# Patient Record
Sex: FEMALE | Race: WHITE | NOT HISPANIC OR LATINO | Employment: FULL TIME | URBAN - METROPOLITAN AREA
[De-identification: names, ages, dates, MRNs, and addresses within clinical notes are randomized per-mention and may not be internally consistent; named-entity substitution may affect disease eponyms.]

---

## 2020-07-12 NOTE — PROGRESS NOTES
Subjective   Chief Complaint   Patient presents with   • Annual Exam       History of Present Illness   60 y.o. female presents as a new patient to establish care and have a physical. No previous PCP. Feeling well overall other than fatigue. Notes she has a nervous stomach and has had elevated BP in the past but has never taken anything for it. Denies N/V/D/C. Denies CP or dyspnea. Hysterectomy 15 years with Dr. Vaughn as a result of fibroids and endometriosis. No pelvic exam since then. No MMG or DXA scan.     Never had colonoscopy. Denies hematochezia or melena.     Recently seen and treated at [facility] for acute otitis media  and HA for which she was treated with Augmentin. Resolved.      There is no problem list on file for this patient.      No Known Allergies    Current Outpatient Medications on File Prior to Visit   Medication Sig Dispense Refill   • naproxen sodium (ALEVE) 220 MG tablet Take 220 mg by mouth 2 (Two) Times a Day As Needed.       No current facility-administered medications on file prior to visit.        History reviewed. No pertinent past medical history.    Family History   Problem Relation Age of Onset   • Hypertension Mother    • Stroke Mother    • Throat cancer Father    • Diabetes Maternal Grandmother        Social History     Socioeconomic History   • Marital status:      Spouse name: Not on file   • Number of children: Not on file   • Years of education: Not on file   • Highest education level: Not on file   Tobacco Use   • Smoking status: Former Smoker   • Smokeless tobacco: Never Used   • Tobacco comment: Less than 1/2 ppd for 18 months 40 years ago.    Substance and Sexual Activity   • Alcohol use: Yes     Frequency: Monthly or less     Drinks per session: 1 or 2     Comment: social    • Drug use: Never   • Sexual activity: Not Currently     Partners: Male       Past Surgical History:   Procedure Laterality Date   • HYSTERECTOMY  2005    Dr. Vaughn   • KNEE MENISCAL REPAIR Left   "   Dr. Abel   • REPLACEMENT TOTAL KNEE Right     Dr. Patterson       The following portions of the patient's history were reviewed and updated as appropriate: problem list, allergies, current medications, past medical history, past family history, past social history and past surgical history.    Review of Systems   Constitutional: Negative for fatigue.   HENT: Negative for congestion.    Eyes: Negative for visual disturbance.   Respiratory: Negative for shortness of breath.    Cardiovascular: Negative for chest pain.   Gastrointestinal: Negative for blood in stool.   Endocrine: Negative.    Genitourinary: Negative.    Musculoskeletal: Negative for arthralgias.   Skin: Negative for rash.   Allergic/Immunologic: Negative for environmental allergies.   Neurological: Negative for headache.   Hematological: Does not bruise/bleed easily.   Psychiatric/Behavioral: The patient is not nervous/anxious.        Immunization History   Administered Date(s) Administered   • Influenza Split Preservative Free ID 12/10/2012   • Tdap 07/13/2020       Objective   Vitals:    07/13/20 1016 07/13/20 1058   BP:  168/96   Pulse:  88   Resp:  14   Temp: 97.1 °F (36.2 °C)    Weight: 135 kg (297 lb)    Height: 170.2 cm (67\")      Body mass index is 46.52 kg/m².  Physical Exam   Constitutional: She appears well-developed and well-nourished.   HENT:   Head: Normocephalic and atraumatic.   Right Ear: External ear normal.   Left Ear: External ear normal.   Nose: Nose normal.   Mouth/Throat: Oropharynx is clear and moist.   Eyes: Pupils are equal, round, and reactive to light. Conjunctivae and EOM are normal.   Neck: Neck supple. Carotid bruit is not present. No thyromegaly present.   Cardiovascular: Normal rate, regular rhythm, normal heart sounds and intact distal pulses.   No murmur heard.  Pulmonary/Chest: Effort normal and breath sounds normal.   Abdominal: Soft. Bowel sounds are normal. She exhibits no distension and no mass. There is no " hepatosplenomegaly. There is no tenderness. There is no rebound.   Genitourinary:   Genitourinary Comments: P/P and CBE with GYN.    Musculoskeletal:   Ambulates without assistance; no clubbing, cyanosis or edema.    Lymphadenopathy:     She has no cervical adenopathy.   Neurological: She is alert.   Skin: Skin is warm and dry.   Psychiatric: She has a normal mood and affect.   Vitals reviewed.      Procedures    Assessment/Plan   Oanh was seen today for annual exam.    Diagnoses and all orders for this visit:    Annual physical exam  Comments:  Weight loss via dietary changes and exercise advised. Flu shot when available. SG at local pharmacy. C-scope with  as below.   Orders:  -     Comprehensive Metabolic Panel  -     Lipid Panel With / Chol / HDL Ratio    Need for hepatitis C screening test  -     HCV Antibody Rfx To Qnt PCR    Need for Tdap vaccination  -     Tdap Vaccine Greater Than or Equal To 6yo IM    Colon cancer screening  Comments:  With Dr. Long.   Orders:  -     Ambulatory Referral For Screening Colonoscopy    Fatigue, unspecified type  -     Comprehensive Metabolic Panel  -     CBC & Differential  -     TSH    Essential hypertension  Comments:  New problem. Start Lisinopril as below. Side effects reviewed. Weight loss via dietary changes and exercise strongly advised. EKG at follow up visit.   Orders:  -     Comprehensive Metabolic Panel  -     lisinopril (PRINIVIL,ZESTRIL) 10 MG tablet; Take 1 tablet by mouth Daily.  -     Basic Metabolic Panel; Future    Records reviewed from [facility].     Return in about 1 month (around 8/13/2020).

## 2020-07-13 ENCOUNTER — OFFICE VISIT (OUTPATIENT)
Dept: INTERNAL MEDICINE | Facility: CLINIC | Age: 60
End: 2020-07-13

## 2020-07-13 VITALS
WEIGHT: 293 LBS | DIASTOLIC BLOOD PRESSURE: 96 MMHG | TEMPERATURE: 97.1 F | RESPIRATION RATE: 14 BRPM | HEIGHT: 67 IN | HEART RATE: 88 BPM | BODY MASS INDEX: 45.99 KG/M2 | SYSTOLIC BLOOD PRESSURE: 168 MMHG

## 2020-07-13 DIAGNOSIS — Z11.59 NEED FOR HEPATITIS C SCREENING TEST: ICD-10-CM

## 2020-07-13 DIAGNOSIS — R53.83 FATIGUE, UNSPECIFIED TYPE: ICD-10-CM

## 2020-07-13 DIAGNOSIS — Z12.11 COLON CANCER SCREENING: ICD-10-CM

## 2020-07-13 DIAGNOSIS — I10 ESSENTIAL HYPERTENSION: ICD-10-CM

## 2020-07-13 DIAGNOSIS — Z23 NEED FOR TDAP VACCINATION: ICD-10-CM

## 2020-07-13 DIAGNOSIS — Z00.00 ANNUAL PHYSICAL EXAM: Primary | ICD-10-CM

## 2020-07-13 PROCEDURE — 90715 TDAP VACCINE 7 YRS/> IM: CPT | Performed by: NURSE PRACTITIONER

## 2020-07-13 PROCEDURE — 90471 IMMUNIZATION ADMIN: CPT | Performed by: NURSE PRACTITIONER

## 2020-07-13 PROCEDURE — 99396 PREV VISIT EST AGE 40-64: CPT | Performed by: NURSE PRACTITIONER

## 2020-07-13 RX ORDER — NAPROXEN SODIUM 220 MG
220 TABLET ORAL 2 TIMES DAILY PRN
COMMUNITY

## 2020-07-13 RX ORDER — LISINOPRIL 10 MG/1
10 TABLET ORAL DAILY
Qty: 30 TABLET | Refills: 1 | Status: SHIPPED | OUTPATIENT
Start: 2020-07-13 | End: 2020-07-28

## 2020-07-13 NOTE — PATIENT INSTRUCTIONS
Please schedule you PAP and pelvic exam with your gynecologist Dr. Vaughn. You will have your mammogram and bone density test with them.     You are advised to get Shingrix. It is a series of 2 shots given 2-6 months apart. Get this at you local pharmacy.     Please get your flu shot when it becomes available.     Weight loss via dietary changes and 150 minutes weekly aerobic activity advised.

## 2020-07-21 DIAGNOSIS — I10 ESSENTIAL HYPERTENSION: ICD-10-CM

## 2020-07-21 DIAGNOSIS — Z11.59 NEED FOR HEPATITIS C SCREENING TEST: Primary | ICD-10-CM

## 2020-07-21 DIAGNOSIS — R53.83 FATIGUE, UNSPECIFIED TYPE: ICD-10-CM

## 2020-07-23 LAB
ALBUMIN SERPL-MCNC: 3.7 G/DL (ref 3.5–5.2)
ALBUMIN/GLOB SERPL: 1.3 G/DL
ALP SERPL-CCNC: 84 U/L (ref 39–117)
ALT SERPL-CCNC: 25 U/L (ref 1–33)
AST SERPL-CCNC: 14 U/L (ref 1–32)
BASOPHILS # BLD AUTO: 0.02 10*3/MM3 (ref 0–0.2)
BASOPHILS NFR BLD AUTO: 0.4 % (ref 0–1.5)
BILIRUB SERPL-MCNC: 1.1 MG/DL (ref 0–1.2)
BUN SERPL-MCNC: 12 MG/DL (ref 8–23)
BUN/CREAT SERPL: 13.3 (ref 7–25)
CALCIUM SERPL-MCNC: 9.6 MG/DL (ref 8.6–10.5)
CHLORIDE SERPL-SCNC: 103 MMOL/L (ref 98–107)
CHOLEST SERPL-MCNC: 192 MG/DL (ref 0–200)
CHOLEST/HDLC SERPL: 3.62 {RATIO}
CO2 SERPL-SCNC: 25.2 MMOL/L (ref 22–29)
CREAT SERPL-MCNC: 0.9 MG/DL (ref 0.57–1)
EOSINOPHIL # BLD AUTO: 0.2 10*3/MM3 (ref 0–0.4)
EOSINOPHIL NFR BLD AUTO: 4 % (ref 0.3–6.2)
ERYTHROCYTE [DISTWIDTH] IN BLOOD BY AUTOMATED COUNT: 13.3 % (ref 12.3–15.4)
GLOBULIN SER CALC-MCNC: 2.8 GM/DL
GLUCOSE SERPL-MCNC: 164 MG/DL (ref 65–99)
HCT VFR BLD AUTO: 39.9 % (ref 34–46.6)
HCV AB S/CO SERPL IA: <0.1 S/CO RATIO (ref 0–0.9)
HDLC SERPL-MCNC: 53 MG/DL (ref 40–60)
HGB BLD-MCNC: 13.8 G/DL (ref 12–15.9)
IMM GRANULOCYTES # BLD AUTO: 0.01 10*3/MM3 (ref 0–0.05)
IMM GRANULOCYTES NFR BLD AUTO: 0.2 % (ref 0–0.5)
LDLC SERPL CALC-MCNC: 116 MG/DL (ref 0–100)
LYMPHOCYTES # BLD AUTO: 2.43 10*3/MM3 (ref 0.7–3.1)
LYMPHOCYTES NFR BLD AUTO: 48.4 % (ref 19.6–45.3)
MCH RBC QN AUTO: 30.3 PG (ref 26.6–33)
MCHC RBC AUTO-ENTMCNC: 34.6 G/DL (ref 31.5–35.7)
MCV RBC AUTO: 87.5 FL (ref 79–97)
MONOCYTES # BLD AUTO: 0.35 10*3/MM3 (ref 0.1–0.9)
MONOCYTES NFR BLD AUTO: 7 % (ref 5–12)
NEUTROPHILS # BLD AUTO: 2.01 10*3/MM3 (ref 1.7–7)
NEUTROPHILS NFR BLD AUTO: 40 % (ref 42.7–76)
NRBC BLD AUTO-RTO: 0 /100 WBC (ref 0–0.2)
PLATELET # BLD AUTO: 303 10*3/MM3 (ref 140–450)
POTASSIUM SERPL-SCNC: 4.6 MMOL/L (ref 3.5–5.2)
PROT SERPL-MCNC: 6.5 G/DL (ref 6–8.5)
RBC # BLD AUTO: 4.56 10*6/MM3 (ref 3.77–5.28)
SODIUM SERPL-SCNC: 139 MMOL/L (ref 136–145)
TRIGL SERPL-MCNC: 113 MG/DL (ref 0–150)
TSH SERPL DL<=0.005 MIU/L-ACNC: 0.06 UIU/ML (ref 0.27–4.2)
VLDLC SERPL CALC-MCNC: 22.6 MG/DL
WBC # BLD AUTO: 5.02 10*3/MM3 (ref 3.4–10.8)

## 2020-07-24 LAB
HBA1C MFR BLD: 7.4 % (ref 4.8–5.6)
Lab: NORMAL
T4 FREE SERPL-MCNC: 1.32 NG/DL (ref 0.93–1.7)
WRITTEN AUTHORIZATION: NORMAL

## 2020-07-26 RX ORDER — ATORVASTATIN CALCIUM 10 MG/1
10 TABLET, FILM COATED ORAL DAILY
Qty: 90 TABLET | Refills: 1 | Status: CANCELLED | OUTPATIENT
Start: 2020-07-26

## 2020-07-28 ENCOUNTER — OFFICE VISIT (OUTPATIENT)
Dept: INTERNAL MEDICINE | Facility: CLINIC | Age: 60
End: 2020-07-28

## 2020-07-28 VITALS
SYSTOLIC BLOOD PRESSURE: 150 MMHG | HEIGHT: 67 IN | BODY MASS INDEX: 45.99 KG/M2 | WEIGHT: 293 LBS | RESPIRATION RATE: 16 BRPM | HEART RATE: 80 BPM | DIASTOLIC BLOOD PRESSURE: 100 MMHG | TEMPERATURE: 97.3 F

## 2020-07-28 DIAGNOSIS — Z23 NEED FOR 23-POLYVALENT PNEUMOCOCCAL POLYSACCHARIDE VACCINE: ICD-10-CM

## 2020-07-28 DIAGNOSIS — I10 ESSENTIAL HYPERTENSION: ICD-10-CM

## 2020-07-28 DIAGNOSIS — E66.01 CLASS 3 SEVERE OBESITY WITH BODY MASS INDEX (BMI) OF 45.0 TO 49.9 IN ADULT, UNSPECIFIED OBESITY TYPE, UNSPECIFIED WHETHER SERIOUS COMORBIDITY PRESENT (HCC): ICD-10-CM

## 2020-07-28 DIAGNOSIS — E11.9 TYPE 2 DIABETES MELLITUS WITHOUT COMPLICATION, WITHOUT LONG-TERM CURRENT USE OF INSULIN (HCC): Primary | ICD-10-CM

## 2020-07-28 DIAGNOSIS — Z23 NEED FOR HEPATITIS A VACCINATION: ICD-10-CM

## 2020-07-28 DIAGNOSIS — R79.89 ABNORMAL TSH: ICD-10-CM

## 2020-07-28 PROCEDURE — 90471 IMMUNIZATION ADMIN: CPT | Performed by: NURSE PRACTITIONER

## 2020-07-28 PROCEDURE — 90632 HEPA VACCINE ADULT IM: CPT | Performed by: NURSE PRACTITIONER

## 2020-07-28 PROCEDURE — 90472 IMMUNIZATION ADMIN EACH ADD: CPT | Performed by: NURSE PRACTITIONER

## 2020-07-28 PROCEDURE — 90732 PPSV23 VACC 2 YRS+ SUBQ/IM: CPT | Performed by: NURSE PRACTITIONER

## 2020-07-28 PROCEDURE — 99214 OFFICE O/P EST MOD 30 MIN: CPT | Performed by: NURSE PRACTITIONER

## 2020-07-28 RX ORDER — LISINOPRIL 20 MG/1
20 TABLET ORAL DAILY
Qty: 90 TABLET | Refills: 1 | Status: SHIPPED | OUTPATIENT
Start: 2020-07-28 | End: 2020-09-18

## 2020-07-28 RX ORDER — ROSUVASTATIN CALCIUM 5 MG/1
5 TABLET, COATED ORAL DAILY
Qty: 90 TABLET | Refills: 3 | Status: SHIPPED | OUTPATIENT
Start: 2020-07-28

## 2020-07-28 NOTE — PATIENT INSTRUCTIONS
Please schedule a dilated eye exam with Dr. Liza Cast off [facility] [place]

## 2020-08-11 ENCOUNTER — RESULTS ENCOUNTER (OUTPATIENT)
Dept: INTERNAL MEDICINE | Facility: CLINIC | Age: 60
End: 2020-08-11

## 2020-08-11 DIAGNOSIS — I10 ESSENTIAL HYPERTENSION: ICD-10-CM

## 2020-08-17 DIAGNOSIS — I10 ESSENTIAL HYPERTENSION: Primary | ICD-10-CM

## 2020-08-17 DIAGNOSIS — R79.89 ABNORMAL TSH: ICD-10-CM

## 2020-08-20 ENCOUNTER — RESULTS ENCOUNTER (OUTPATIENT)
Dept: INTERNAL MEDICINE | Facility: CLINIC | Age: 60
End: 2020-08-20

## 2020-08-20 ENCOUNTER — TELEMEDICINE (OUTPATIENT)
Dept: INTERNAL MEDICINE | Facility: CLINIC | Age: 60
End: 2020-08-20

## 2020-08-20 ENCOUNTER — TELEPHONE (OUTPATIENT)
Dept: INTERNAL MEDICINE | Facility: CLINIC | Age: 60
End: 2020-08-20

## 2020-08-20 DIAGNOSIS — R19.7 DIARRHEA, UNSPECIFIED TYPE: Primary | ICD-10-CM

## 2020-08-20 DIAGNOSIS — R79.89 ABNORMAL TSH: ICD-10-CM

## 2020-08-20 DIAGNOSIS — R05.9 COUGH: ICD-10-CM

## 2020-08-20 DIAGNOSIS — Z20.822 EXPOSURE TO COVID-19 VIRUS: ICD-10-CM

## 2020-08-20 PROCEDURE — 99442 PR PHYS/QHP TELEPHONE EVALUATION 11-20 MIN: CPT | Performed by: NURSE PRACTITIONER

## 2020-08-20 NOTE — TELEPHONE ENCOUNTER
PATIENT CALLED AND STATES SHE WAS EXPOSED FROM A COWORKER AND IS EXPERIENCING SOME DIARRHEA. SHE NEEDS TO BE TESTED PER HER EMPLOYER.   PLEASE CALL [phone]  WITH DIRECTIONS WHERE TO GET TESTED

## 2020-08-20 NOTE — PROGRESS NOTES
Subjective   Chief Complaint   Patient presents with   • Exposure To Known Illness       History of Present Illness   60 y.o. female presents via telephone visit with cc as above. Co-worker tested positive and was advised not to report to work. She has had contact with her but with distancing but their masks were off. 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 Notes cough with occasional phlegm and drainage ongoing for a couple of days. Denies fever or chills, sore throat, dyspnea, loss of smell or taste. Quarantining at home.      There is no problem list on file for this patient.      No Known Allergies    Current Outpatient Medications on File Prior to Visit   Medication Sig Dispense Refill   • lisinopril (PRINIVIL,ZESTRIL) 20 MG tablet Take 1 tablet by mouth Daily. 90 tablet 1   • metFORMIN (Glucophage) 500 MG tablet Take 1 tablet by mouth 2 (Two) Times a Day With Meals. 30 tablet 3   • naproxen sodium (ALEVE) 220 MG tablet Take 220 mg by mouth 2 (Two) Times a Day As Needed.     • rosuvastatin (Crestor) 5 MG tablet Take 1 tablet by mouth Daily. 90 tablet 3     No current facility-administered medications on file prior to visit.        History reviewed. No pertinent past medical history.    Family History   Problem Relation Age of Onset   • Hypertension Mother    • Stroke Mother    • Throat cancer Father    • Diabetes Maternal Grandmother        Social History     Socioeconomic History   • Marital status:      Spouse name: Not on file   • Number of children: Not on file   • Years of education: Not on file   • Highest education level: Not on file   Tobacco Use   • Smoking status: Former Smoker   • Smokeless tobacco: Never Used   • Tobacco comment: Less than 1/2 ppd for 18 months 40 years ago.    Substance and Sexual Activity   • Alcohol use: Yes     Frequency: Monthly or less     Drinks per session: 1 or 2     Comment: social    • Drug use:  Never   • Sexual activity: Not Currently     Partners: Male       Past Surgical History:   Procedure Laterality Date   • HYSTERECTOMY  2005    Dr. Vaughn   • KNEE MENISCAL REPAIR Left     Dr. Abel   • REPLACEMENT TOTAL KNEE Right     Dr. Patterson       The following portions of the patient's history were reviewed and updated as appropriate: problem list, allergies, current medications, past medical history and past social history.    Review of Systems   Constitutional: Negative for chills and fever.   Respiratory: Positive for cough. Negative for shortness of breath.    Gastrointestinal: Positive for diarrhea.       Immunization History   Administered Date(s) Administered   • Hepatitis A 07/28/2020   • Influenza Split Preservative Free ID 12/10/2012   • Pneumococcal Polysaccharide (PPSV23) 07/28/2020   • Tdap 07/13/2020       Objective   Vitals:     There is no height or weight on file to calculate BMI.  Physical Exam    Procedures    Assessment/Plan   Oanh was seen today for exposure to known illness.    Diagnoses and all orders for this visit:    Diarrhea, unspecified type  -     COVID-19,[facility] ROUTINE, NP/OP SWAB IN TRANSPORT MEDIA OR ESWAB 72 HR TAT - Swab, Nasopharynx; Future  -     QUESTIONNAIRE SERIES    Exposure to COVID-19 virus  Comments:  Co-worker at  tested positive several days ago. Self-quarantine at home away from other family memebers residing in her home.   Orders:  -     COVID-19,[facility] ROUTINE, NP/OP SWAB IN TRANSPORT MEDIA OR ESWAB 72 HR TAT - Swab, Nasopharynx; Future  -     QUESTIONNAIRE SERIES    Cough  -     COVID-19,[facility] ROUTINE, NP/OP SWAB IN TRANSPORT MEDIA OR ESWAB 72 HR TAT - Swab, Nasopharynx; Future  -     QUESTIONNAIRE SERIES    Records reviewed include previous OV with myself as well as labs.     15 minute telephone visit to discuss recent COVID exposure as well as new onset cough and diarrhea since exposure. Consent for visit obtained.     Return for Next scheduled  follow up.

## 2020-08-20 NOTE — PATIENT INSTRUCTIONS
Please have your swab at [facility] on [address] [facility] [address] [facility] and self-quarantine at home at least until results of COVID testing completed. Call should additional symptoms develop or should you have questions or concerns.

## 2020-08-24 DIAGNOSIS — Z20.822 EXPOSURE TO COVID-19 VIRUS: Primary | ICD-10-CM

## 2020-09-14 ENCOUNTER — LAB (OUTPATIENT)
Dept: INTERNAL MEDICINE | Facility: CLINIC | Age: 60
End: 2020-09-14

## 2020-09-14 LAB
BUN SERPL-MCNC: 13 MG/DL (ref 8–23)
BUN/CREAT SERPL: 13 (ref 7–25)
CALCIUM SERPL-MCNC: 10 MG/DL (ref 8.6–10.5)
CHLORIDE SERPL-SCNC: 102 MMOL/L (ref 98–107)
CO2 SERPL-SCNC: 26.6 MMOL/L (ref 22–29)
CREAT SERPL-MCNC: 1 MG/DL (ref 0.57–1)
GLUCOSE SERPL-MCNC: 152 MG/DL (ref 65–99)
POTASSIUM SERPL-SCNC: 4.8 MMOL/L (ref 3.5–5.2)
SODIUM SERPL-SCNC: 140 MMOL/L (ref 136–145)
T4 SERPL-MCNC: 8.24 MCG/DL (ref 4.5–11.7)
TSH SERPL DL<=0.005 MIU/L-ACNC: 0.04 UIU/ML (ref 0.27–4.2)

## 2020-09-16 PROBLEM — I10 ESSENTIAL HYPERTENSION: Status: ACTIVE | Noted: 2020-09-16

## 2020-09-16 PROBLEM — E11.9 TYPE 2 DIABETES MELLITUS WITHOUT COMPLICATION, WITHOUT LONG-TERM CURRENT USE OF INSULIN (HCC): Status: ACTIVE | Noted: 2020-09-16

## 2020-09-16 NOTE — PROGRESS NOTES
Subjective   Chief Complaint   Patient presents with   • Diabetes     follow up   • Thyroid Problem   • Cough       History of Present Illness   60 y.o. female presents for follow up regarding newly diagnosed type 2 diabetes. Has not gone to DM ed due to time constraints. Had diarrhea with Metformin. Quit taking am dose of Metformin as a result; improved. No flu shot yet. Eye exam with Dr. Johnson in March but not detailed exam. Denies anxiety. Notes stress. No history of neck being tender. No changes in her vision.     Notes cough. Has had COVID testing twice in August after co-worker tested positive. She was negative. Presents today with cough. Initially thought it was likely her Lisinopril but it is getting worse and is productive with clear sputum. Denies dyspnea. Notes sinus drainage. Denies fever or chills.     C-scope to be scheduled with Dr. Long in the nest 6 months. Advised to schedule pelvic with Dr. Vaughn.      Patient Active Problem List   Diagnosis   • Essential hypertension   • Type 2 diabetes mellitus without complication, without long-term current use of insulin (CMS/[facility])       No Known Allergies    Current Outpatient Medications on File Prior to Visit   Medication Sig Dispense Refill   • naproxen sodium (ALEVE) 220 MG tablet Take 220 mg by mouth 2 (Two) Times a Day As Needed.     • rosuvastatin (Crestor) 5 MG tablet Take 1 tablet by mouth Daily. 90 tablet 3   • [DISCONTINUED] lisinopril (PRINIVIL,ZESTRIL) 20 MG tablet Take 1 tablet by mouth Daily. 90 tablet 1   • [DISCONTINUED] metFORMIN (Glucophage) 500 MG tablet Take 1 tablet by mouth 2 (Two) Times a Day With Meals. 30 tablet 3     No current facility-administered medications on file prior to visit.        History reviewed. No pertinent past medical history.    Family History   Problem Relation Age of Onset   • Hypertension Mother    • Stroke Mother    • Throat cancer Father    • Diabetes Maternal Grandmother        Social History  "    Socioeconomic History   • Marital status:      Spouse name: Not on file   • Number of children: Not on file   • Years of education: Not on file   • Highest education level: Not on file   Tobacco Use   • Smoking status: Former Smoker   • Smokeless tobacco: Never Used   • Tobacco comment: Less than 1/2 ppd for 18 months 40 years ago.    Substance and Sexual Activity   • Alcohol use: Yes     Frequency: Monthly or less     Drinks per session: 1 or 2     Comment: social    • Drug use: Never   • Sexual activity: Not Currently     Partners: Male       Past Surgical History:   Procedure Laterality Date   • HYSTERECTOMY  2005    Dr. Vaughn   • KNEE MENISCAL REPAIR Left     Dr. Abel   • REPLACEMENT TOTAL KNEE Right     Dr. Patterson       The following portions of the patient's history were reviewed and updated as appropriate: problem list, allergies, current medications, past medical history, past family history, past social history and past surgical history.    Review of Systems   Constitutional: Negative for chills and fever.   Respiratory: Positive for cough. Negative for shortness of breath.    Cardiovascular: Negative for chest pain.   Gastrointestinal: Positive for diarrhea.       Immunization History   Administered Date(s) Administered   • Hepatitis A 07/28/2020   • Influenza Split Preservative Free ID 12/10/2012   • Pneumococcal Polysaccharide (PPSV23) 07/28/2020   • Tdap 07/13/2020       Objective   Vitals:    09/18/20 1006   Temp: 98.4 °F (36.9 °C)   Weight: 131 kg (289 lb)   Height: 170.2 cm (67\")     Body mass index is 45.26 kg/m².  Physical Exam  Vitals signs reviewed.   Constitutional:       Appearance: Normal appearance. She is well-developed. She is obese.   HENT:      Head: Normocephalic and atraumatic.      Nose:      Comments: Nasal turbinates moist with clear drainage noted.      Mouth/Throat:      Mouth: Mucous membranes are moist.      Pharynx: Oropharynx is clear. No posterior oropharyngeal " erythema.   Neck:      Musculoskeletal: Neck supple.   Cardiovascular:      Rate and Rhythm: Normal rate and regular rhythm.      Heart sounds: Normal heart sounds, S1 normal and S2 normal.   Pulmonary:      Effort: Pulmonary effort is normal.      Breath sounds: Normal breath sounds.   Lymphadenopathy:      Cervical: No cervical adenopathy.   Skin:     General: Skin is warm and dry.   Neurological:      Mental Status: She is alert.   Psychiatric:         Mood and Affect: Mood normal.         Behavior: Behavior normal.         Procedures    Assessment/Plan   Oanh was seen today for diabetes, thyroid problem and cough.    Diagnoses and all orders for this visit:    Type 2 diabetes mellitus without complication, without long-term current use of insulin (CMS/[facility])  Comments:  Schedule eye exam with Dr. Be or Dr. Cast. Diabetes ed strongly advised. Change Metformin to XR. Flu shot and PVX at next appt. Lab orders previously placed.  Orders:  -     Ambulatory Referral to Diabetic Education  -     metFORMIN ER (GLUCOPHAGE-XR) 500 MG 24 hr tablet; Take 1 tablet by mouth Daily With Breakfast.    Essential hypertension  Comments:  Not at goal but is understandabley upset regarding her cough and concern for COVID. Stop Lisinopril and change to Losartan HCT as below. RTO in 4-6 weeks.   Orders:  -     losartan (Cozaar) 50 MG tablet; Take 1 tablet by mouth Daily.    Cough  Comments:  COVID testing today at [facility]. Hopeful this is negative and which point would treat for seaonal allergies--Flonase and Zyrtec.   Orders:  -     COVID-19,[facility] ROUTINE, NP/OP SWAB IN TRANSPORT MEDIA OR ESWAB 72 HR TAT - Swab, Nasopharynx; Future    Exposure to COVID-19 virus  -     COVID-19,[facility] ROUTINE, NP/OP SWAB IN TRANSPORT MEDIA OR ESWAB 72 HR TAT - Swab, Nasopharynx; Future    Abnormal TSH  Comments:  TSH shows hyper on two occasions with free T4 and T3 normal. WIll have her see endocrine--Dr. Gomez if in network.   Orders:  -      Ambulatory Referral to Endocrinology  -     COVID-19,[facility] ROUTINE, NP/OP SWAB IN TRANSPORT MEDIA OR ESWAB 72 HR TAT - Swab, Nasopharynx; Future    Morbid obesity (CMS/HCC)  Comments:  Weight loss via dietary changes and 150 minutes weekly aerobic activity advised.     Records reviewed include previous OV with myself as well as labs.     Return in about 6 weeks (around 10/30/2020) for follow up, Lab B4 FUP.

## 2020-09-17 LAB
T3FREE SERPL-MCNC: 3.8 PG/ML (ref 2–4.4)
T4 FREE SERPL-MCNC: 1.36 NG/DL (ref 0.93–1.7)

## 2020-09-18 ENCOUNTER — OFFICE VISIT (OUTPATIENT)
Dept: INTERNAL MEDICINE | Facility: CLINIC | Age: 60
End: 2020-09-18

## 2020-09-18 VITALS
BODY MASS INDEX: 45.36 KG/M2 | HEART RATE: 88 BPM | TEMPERATURE: 98.4 F | DIASTOLIC BLOOD PRESSURE: 78 MMHG | WEIGHT: 289 LBS | HEIGHT: 67 IN | SYSTOLIC BLOOD PRESSURE: 160 MMHG | RESPIRATION RATE: 16 BRPM

## 2020-09-18 DIAGNOSIS — E66.01 MORBID OBESITY (HCC): ICD-10-CM

## 2020-09-18 DIAGNOSIS — I10 ESSENTIAL HYPERTENSION: ICD-10-CM

## 2020-09-18 DIAGNOSIS — E11.9 TYPE 2 DIABETES MELLITUS WITHOUT COMPLICATION, WITHOUT LONG-TERM CURRENT USE OF INSULIN (HCC): Primary | ICD-10-CM

## 2020-09-18 DIAGNOSIS — R79.89 ABNORMAL TSH: ICD-10-CM

## 2020-09-18 DIAGNOSIS — R05.9 COUGH: ICD-10-CM

## 2020-09-18 DIAGNOSIS — Z20.822 EXPOSURE TO COVID-19 VIRUS: ICD-10-CM

## 2020-09-18 LAB
Lab: NORMAL
T3 SERPL-MCNC: 161 NG/DL (ref 80–200)
WRITTEN AUTHORIZATION: NORMAL

## 2020-09-18 PROCEDURE — 99214 OFFICE O/P EST MOD 30 MIN: CPT | Performed by: NURSE PRACTITIONER

## 2020-09-18 RX ORDER — LOSARTAN POTASSIUM 50 MG/1
50 TABLET ORAL DAILY
Qty: 30 TABLET | Refills: 1 | Status: SHIPPED | OUTPATIENT
Start: 2020-09-18 | End: 2020-10-29 | Stop reason: SDUPTHER

## 2020-09-18 RX ORDER — METFORMIN HYDROCHLORIDE 500 MG/1
500 TABLET, EXTENDED RELEASE ORAL
Qty: 30 TABLET | Refills: 1 | Status: SHIPPED | OUTPATIENT
Start: 2020-09-18 | End: 2021-04-26

## 2020-10-26 ENCOUNTER — RESULTS ENCOUNTER (OUTPATIENT)
Dept: INTERNAL MEDICINE | Facility: CLINIC | Age: 60
End: 2020-10-26

## 2020-10-26 DIAGNOSIS — E11.9 TYPE 2 DIABETES MELLITUS WITHOUT COMPLICATION, WITHOUT LONG-TERM CURRENT USE OF INSULIN (HCC): ICD-10-CM

## 2020-10-29 DIAGNOSIS — I10 ESSENTIAL HYPERTENSION: ICD-10-CM

## 2020-10-30 RX ORDER — LOSARTAN POTASSIUM 50 MG/1
50 TABLET ORAL DAILY
Qty: 30 TABLET | Refills: 0 | Status: SHIPPED | OUTPATIENT
Start: 2020-10-30 | End: 2021-02-05 | Stop reason: SDUPTHER

## 2021-02-03 ENCOUNTER — TELEPHONE (OUTPATIENT)
Dept: INTERNAL MEDICINE | Facility: CLINIC | Age: 61
End: 2021-02-03

## 2021-02-03 NOTE — TELEPHONE ENCOUNTER
Need order for Covid Test to return to work, [phone], called into work today because of headache, states she need a negative test before she can return to work.

## 2021-02-04 ENCOUNTER — CLINICAL SUPPORT (OUTPATIENT)
Dept: INTERNAL MEDICINE | Facility: CLINIC | Age: 61
End: 2021-02-04

## 2021-02-04 ENCOUNTER — TELEPHONE (OUTPATIENT)
Dept: INTERNAL MEDICINE | Facility: CLINIC | Age: 61
End: 2021-02-04

## 2021-02-04 DIAGNOSIS — Z20.822 EXPOSURE TO COVID-19 VIRUS: Primary | ICD-10-CM

## 2021-02-04 NOTE — TELEPHONE ENCOUNTER
Caller: Oanh Edwards    Relationship to patient: Self    Best call back number: [phone]    Patient is needing: PATIENT SAID THAT SHE CALLED IN WORK YESTERDAY DUE TO A MIGRAINE. 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 PATIENT ASKED TO BE NOTIFIED THROUGH [employer].

## 2021-02-04 NOTE — TELEPHONE ENCOUNTER
Pt states she called into work since she had a headache for about 1 week, yesterday she had a migraine- her boss wants her to have a covid test before returning to work. IT is going around work so they are wanting her to have a test before she can return. Also reports a reoccurring cough.

## 2021-02-05 DIAGNOSIS — I10 ESSENTIAL HYPERTENSION: ICD-10-CM

## 2021-02-05 LAB
ONE SPECIMEN IDENTIFIER: NORMAL
SARS-COV-2 RNA RESP QL NAA+PROBE: NOT DETECTED

## 2021-02-05 RX ORDER — LOSARTAN POTASSIUM 50 MG/1
50 TABLET ORAL DAILY
Qty: 30 TABLET | Refills: 0 | Status: SHIPPED | OUTPATIENT
Start: 2021-02-05 | End: 2021-04-26 | Stop reason: SDUPTHER

## 2021-02-05 NOTE — TELEPHONE ENCOUNTER
Refill this time--no additional refills until seen in the office--she was supposed to follow up in the fall. Please be sure this is scheduled.

## 2021-03-22 ENCOUNTER — BULK ORDERING (OUTPATIENT)
Dept: CASE MANAGEMENT | Facility: OTHER | Age: 61
End: 2021-03-22

## 2021-03-22 DIAGNOSIS — Z23 IMMUNIZATION DUE: ICD-10-CM

## 2021-03-31 DIAGNOSIS — I10 ESSENTIAL HYPERTENSION: ICD-10-CM

## 2021-03-31 RX ORDER — LOSARTAN POTASSIUM 50 MG/1
TABLET ORAL
Qty: 30 TABLET | Refills: 0 | OUTPATIENT
Start: 2021-03-31

## 2021-04-04 DIAGNOSIS — I10 ESSENTIAL HYPERTENSION: ICD-10-CM

## 2021-04-05 RX ORDER — LOSARTAN POTASSIUM 50 MG/1
50 TABLET ORAL DAILY
Qty: 30 TABLET | Refills: 0 | OUTPATIENT
Start: 2021-04-05

## 2021-04-25 NOTE — PROGRESS NOTES
Subjective   Chief Complaint   Patient presents with   • Hypertension     f/u   • Diabetes   • Obesity       History of Present Illness   61 y.o. female presents for follow up regarding HTN and diabetes. She has not been seen for 9 months as she was going through thyroid evaluation with Dr. Singletary with US, thyroid uptake scan, thyroid bx--non-cancerous. She has not followed up with Dr. Singletary since [place].     Unable to tolerated Metformin as she had public incontinence. No eye exam since last year but will schedule. She is down 16# with intermittent fasting and would like to try a more natural way to approach her diabetes with lifestyle changes. She has been caring for a grandchild with Type I diabetes and has declined DMed. Out of Losartan for 3 weeks. No exercise. No chest pain, dyspnea, GERMAN or hypoglycemic episodes. No changes in her vision.     COVID vaccination complete.      Patient Active Problem List   Diagnosis   • Essential hypertension   • Type 2 diabetes mellitus without complication, without long-term current use of insulin (CMS/[facility])   • Other hyperlipidemia       No Known Allergies    Current Outpatient Medications on File Prior to Visit   Medication Sig Dispense Refill   • naproxen sodium (ALEVE) 220 MG tablet Take 220 mg by mouth 2 (Two) Times a Day As Needed.     • rosuvastatin (Crestor) 5 MG tablet Take 1 tablet by mouth Daily. 90 tablet 3   • [DISCONTINUED] losartan (Cozaar) 50 MG tablet Take 1 tablet by mouth Daily. 30 tablet 0   • [DISCONTINUED] metFORMIN ER (GLUCOPHAGE-XR) 500 MG 24 hr tablet Take 1 tablet by mouth Daily With Breakfast. 30 tablet 1     No current facility-administered medications on file prior to visit.       History reviewed. No pertinent past medical history.    Family History   Problem Relation Age of Onset   • Hypertension Mother    • Stroke Mother    • Throat cancer Father    • Diabetes Maternal Grandmother        Social History     Socioeconomic History   • Marital  "status:      Spouse name: Not on file   • Number of children: Not on file   • Years of education: Not on file   • Highest education level: Not on file   Tobacco Use   • Smoking status: Former Smoker   • Smokeless tobacco: Never Used   • Tobacco comment: Less than 1/2 ppd for 18 months 40 years ago.    Substance and Sexual Activity   • Alcohol use: Yes     Comment: social    • Drug use: Never   • Sexual activity: Not Currently     Partners: Male       Past Surgical History:   Procedure Laterality Date   • HYSTERECTOMY  2005    Dr. Vaughn   • KNEE MENISCAL REPAIR Left     Dr. Abel   • REPLACEMENT TOTAL KNEE Right     Dr. Patterson       The following portions of the patient's history were reviewed and updated as appropriate: problem list, allergies, current medications, past medical history and past social history.    Review of Systems    Immunization History   Administered Date(s) Administered   • COVID-19 (MODERNA) 02/23/2021, 03/30/2021   • Hepatitis A 07/28/2020   • Influenza Split Preservative Free ID 12/10/2012   • Pneumococcal Polysaccharide (PPSV23) 07/28/2020   • Tdap 07/13/2020       Objective   Vitals:    04/26/21 0949   BP: 164/100   Pulse: 84   Resp: 20   Temp: 97.5 °F (36.4 °C)   Weight: 127 kg (281 lb)   Height: 170.2 cm (67\")     Body mass index is 44.01 kg/m².  Physical Exam  Vitals reviewed.   Constitutional:       Appearance: Normal appearance. She is well-developed. She is obese.   HENT:      Head: Normocephalic and atraumatic.   Cardiovascular:      Rate and Rhythm: Normal rate and regular rhythm.      Heart sounds: Normal heart sounds, S1 normal and S2 normal.   Pulmonary:      Effort: Pulmonary effort is normal.      Breath sounds: Normal breath sounds.   Musculoskeletal:      Comments: Ambulates without assistance; no clubbing, cyanosis or edema.    Skin:     General: Skin is warm and dry.   Neurological:      Mental Status: She is alert.   Psychiatric:         Mood and Affect: Mood " normal.         Behavior: Behavior normal.         Procedures    Assessment/Plan   Diagnoses and all orders for this visit:    1. Type 2 diabetes mellitus without complication, without long-term current use of insulin (CMS/[facility]) (Primary)  Comments:  Labs today. She is down 16# and prefers trial of LSM prior to addition of medication. Will discuss after we see how her A1C is today. Schedule eye exam.   Orders:  -     Comprehensive Metabolic Panel  -     Hemoglobin A1c  -     Lipid Panel With / Chol / HDL Ratio  -     Microalbumin / Creatinine Urine Ratio - Urine, Clean Catch  -     Ambulatory Referral to Nutrition Services    2. Essential hypertension  Comments:  Restart Losartan 50 mg daily and RTO in 2 weeks to recheck BP. Exercise 150 minutes weekly advised.   Orders:  -     losartan (Cozaar) 50 MG tablet; Take 1 tablet by mouth Daily.  Dispense: 30 tablet; Refill: 0    3. Other hyperlipidemia  Comments:  Continue Crestor. Recheck FLP.     4. Morbid obesity with BMI of 40.0-44.9, adult (CMS/[facility])  Comments:  Weight loss via dietary changes and 150 minutes aerobic activity weekly.     5. COVID-19 vaccine series completed    Advised she needs to be seen every 3 months given Type2 diabetes.      Records reviewed include previous OV with myself as well as labs.     Return in about 2 weeks (around 5/10/2021) for Lab Today.

## 2021-04-25 NOTE — PATIENT INSTRUCTIONS
Weight loss via dietary changes and 150 minutes weekly aerobic activity advised.     Please schedule you PAP and pelvic exam with your gynecologist.    Please be sure to schedule your eye exam; if you do not have an eye doctor then I recommend Dr. Poncho Lovelace or Dr. Liza Cast.

## 2021-04-26 ENCOUNTER — OFFICE VISIT (OUTPATIENT)
Dept: INTERNAL MEDICINE | Facility: CLINIC | Age: 61
End: 2021-04-26

## 2021-04-26 VITALS
WEIGHT: 281 LBS | BODY MASS INDEX: 44.1 KG/M2 | HEART RATE: 84 BPM | HEIGHT: 67 IN | RESPIRATION RATE: 20 BRPM | TEMPERATURE: 97.5 F | SYSTOLIC BLOOD PRESSURE: 164 MMHG | DIASTOLIC BLOOD PRESSURE: 100 MMHG

## 2021-04-26 DIAGNOSIS — Z92.29 COVID-19 VACCINE SERIES COMPLETED: ICD-10-CM

## 2021-04-26 DIAGNOSIS — E11.9 TYPE 2 DIABETES MELLITUS WITHOUT COMPLICATION, WITHOUT LONG-TERM CURRENT USE OF INSULIN (HCC): Primary | ICD-10-CM

## 2021-04-26 DIAGNOSIS — E78.49 OTHER HYPERLIPIDEMIA: ICD-10-CM

## 2021-04-26 DIAGNOSIS — E66.01 MORBID OBESITY WITH BMI OF 40.0-44.9, ADULT (HCC): ICD-10-CM

## 2021-04-26 DIAGNOSIS — I10 ESSENTIAL HYPERTENSION: ICD-10-CM

## 2021-04-26 PROCEDURE — 99214 OFFICE O/P EST MOD 30 MIN: CPT | Performed by: NURSE PRACTITIONER

## 2021-04-26 RX ORDER — LOSARTAN POTASSIUM 50 MG/1
50 TABLET ORAL DAILY
Qty: 30 TABLET | Refills: 0 | Status: SHIPPED | OUTPATIENT
Start: 2021-04-26 | End: 2021-05-13

## 2021-04-27 LAB
ALBUMIN SERPL-MCNC: 4.3 G/DL (ref 3.5–5.2)
ALBUMIN/CREAT UR: 10 MG/G CREAT (ref 0–29)
ALBUMIN/GLOB SERPL: 1.5 G/DL
ALP SERPL-CCNC: 97 U/L (ref 39–117)
ALT SERPL-CCNC: 19 U/L (ref 1–33)
AST SERPL-CCNC: 16 U/L (ref 1–32)
BILIRUB SERPL-MCNC: 0.8 MG/DL (ref 0–1.2)
BUN SERPL-MCNC: 14 MG/DL (ref 8–23)
BUN/CREAT SERPL: 15.6 (ref 7–25)
CALCIUM SERPL-MCNC: 9.8 MG/DL (ref 8.6–10.5)
CHLORIDE SERPL-SCNC: 103 MMOL/L (ref 98–107)
CHOLEST SERPL-MCNC: 192 MG/DL (ref 0–200)
CHOLEST/HDLC SERPL: 3.2 {RATIO}
CO2 SERPL-SCNC: 27.9 MMOL/L (ref 22–29)
CREAT SERPL-MCNC: 0.9 MG/DL (ref 0.57–1)
CREAT UR-MCNC: 93.5 MG/DL
GLOBULIN SER CALC-MCNC: 2.8 GM/DL
GLUCOSE SERPL-MCNC: 136 MG/DL (ref 65–99)
HBA1C MFR BLD: 6.8 % (ref 4.8–5.6)
HDLC SERPL-MCNC: 60 MG/DL (ref 40–60)
LDLC SERPL CALC-MCNC: 111 MG/DL (ref 0–100)
MICROALBUMIN UR-MCNC: 9.7 UG/ML
POTASSIUM SERPL-SCNC: 4.2 MMOL/L (ref 3.5–5.2)
PROT SERPL-MCNC: 7.1 G/DL (ref 6–8.5)
SODIUM SERPL-SCNC: 139 MMOL/L (ref 136–145)
TRIGL SERPL-MCNC: 117 MG/DL (ref 0–150)
VLDLC SERPL CALC-MCNC: 21 MG/DL (ref 5–40)
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
## 2021-05-13 ENCOUNTER — OFFICE VISIT (OUTPATIENT)
Dept: INTERNAL MEDICINE | Facility: CLINIC | Age: 61
End: 2021-05-13

## 2021-05-13 VITALS
DIASTOLIC BLOOD PRESSURE: 100 MMHG | SYSTOLIC BLOOD PRESSURE: 162 MMHG | WEIGHT: 283 LBS | TEMPERATURE: 97.3 F | HEIGHT: 67 IN | BODY MASS INDEX: 44.42 KG/M2

## 2021-05-13 DIAGNOSIS — I10 ESSENTIAL HYPERTENSION: Primary | Chronic | ICD-10-CM

## 2021-05-13 DIAGNOSIS — E78.49 OTHER HYPERLIPIDEMIA: Chronic | ICD-10-CM

## 2021-05-13 DIAGNOSIS — E66.01 MORBID OBESITY WITH BMI OF 40.0-44.9, ADULT (HCC): Chronic | ICD-10-CM

## 2021-05-13 DIAGNOSIS — R07.89 CHEST TIGHTNESS: ICD-10-CM

## 2021-05-13 DIAGNOSIS — E11.9 TYPE 2 DIABETES MELLITUS WITHOUT COMPLICATION, WITHOUT LONG-TERM CURRENT USE OF INSULIN (HCC): Chronic | ICD-10-CM

## 2021-05-13 PROCEDURE — 93000 ELECTROCARDIOGRAM COMPLETE: CPT | Performed by: NURSE PRACTITIONER

## 2021-05-13 PROCEDURE — 99214 OFFICE O/P EST MOD 30 MIN: CPT | Performed by: NURSE PRACTITIONER

## 2021-05-13 RX ORDER — LOSARTAN POTASSIUM 100 MG/1
100 TABLET ORAL DAILY
Qty: 90 TABLET | Refills: 0 | Status: SHIPPED | OUTPATIENT
Start: 2021-05-13

## 2021-05-13 RX ORDER — HYDROCHLOROTHIAZIDE 25 MG/1
25 TABLET ORAL DAILY
Qty: 90 TABLET | Refills: 0 | Status: SHIPPED | OUTPATIENT
Start: 2021-05-13

## 2021-06-16 PROBLEM — E78.49 OTHER HYPERLIPIDEMIA: Chronic | Status: ACTIVE | Noted: 2021-04-26

## 2021-06-16 PROBLEM — E11.9 TYPE 2 DIABETES MELLITUS WITHOUT COMPLICATION, WITHOUT LONG-TERM CURRENT USE OF INSULIN (HCC): Chronic | Status: ACTIVE | Noted: 2020-09-16

## 2021-06-16 PROBLEM — I10 ESSENTIAL HYPERTENSION: Chronic | Status: ACTIVE | Noted: 2020-09-16

## 2021-06-16 NOTE — PROGRESS NOTES
Subjective   Chief Complaint   Patient presents with   • Hypertension   • Shortness of Breath   • Neck Pain       History of Present Illness   61 y.o. female presents for follow up regarding HTN for which Losartan was increased to 100 mg last month. LORA continues and now she reports pain in her neck as well as diaphoresis with activity. Denies CP or pain in her neck, jaw, or left arm.  She feels she is obese because of potential food allergies she has been researching.      Patient Active Problem List   Diagnosis   • Essential hypertension   • Type 2 diabetes mellitus without complication, without long-term current use of insulin (CMS/[facility])   • Other hyperlipidemia       Allergies   Allergen Reactions   • Metformin Diarrhea       Current Outpatient Medications on File Prior to Visit   Medication Sig Dispense Refill   • hydroCHLOROthiazide (HYDRODIURIL) 25 MG tablet Take 1 tablet by mouth Daily. 90 tablet 0   • losartan (Cozaar) 100 MG tablet Take 1 tablet by mouth Daily. 90 tablet 0   • naproxen sodium (ALEVE) 220 MG tablet Take 220 mg by mouth 2 (Two) Times a Day As Needed.     • rosuvastatin (Crestor) 5 MG tablet Take 1 tablet by mouth Daily. (Patient taking differently: Take 10 mg by mouth Daily.) 90 tablet 3     No current facility-administered medications on file prior to visit.       History reviewed. No pertinent past medical history.    Family History   Problem Relation Age of Onset   • Hypertension Mother    • Stroke Mother    • Throat cancer Father    • Diabetes Maternal Grandmother        Social History     Socioeconomic History   • Marital status:      Spouse name: Not on file   • Number of children: Not on file   • Years of education: Not on file   • Highest education level: Not on file   Tobacco Use   • Smoking status: Former Smoker   • Smokeless tobacco: Never Used   • Tobacco comment: Less than 1/2 ppd for 18 months 40 years ago.    Substance and Sexual Activity   • Alcohol use: Yes      "Comment: social    • Drug use: Never   • Sexual activity: Not Currently     Partners: Male       Past Surgical History:   Procedure Laterality Date   • HYSTERECTOMY  2005    Dr. Vaughn   • KNEE MENISCAL REPAIR Left     Dr. Abel   • REPLACEMENT TOTAL KNEE Right     Dr. Patterson       The following portions of the patient's history were reviewed and updated as appropriate: problem list, allergies, current medications, past medical history and past social history.    Review of Systems    Immunization History   Administered Date(s) Administered   • COVID-19 (MODERNA) 02/23/2021, 03/30/2021   • Hepatitis A 07/28/2020   • Influenza Split Preservative Free ID 12/10/2012   • Pneumococcal Polysaccharide (PPSV23) 07/28/2020   • Tdap 07/13/2020       Objective   Vitals:    06/18/21 0929   BP: 138/90   Pulse: 76   Resp: 20   Temp: 97.2 °F (36.2 °C)   Weight: 127 kg (279 lb)   Height: 170.2 cm (67\")     Body mass index is 43.7 kg/m².  Physical Exam  Vitals reviewed.   Constitutional:       Appearance: Normal appearance. She is well-developed. She is obese.   HENT:      Head: Normocephalic and atraumatic.   Cardiovascular:      Rate and Rhythm: Normal rate and regular rhythm.      Heart sounds: Normal heart sounds, S1 normal and S2 normal.   Pulmonary:      Effort: Pulmonary effort is normal.      Breath sounds: Normal breath sounds.   Skin:     General: Skin is warm and dry.   Neurological:      Mental Status: She is alert.   Psychiatric:         Mood and Affect: Mood normal.         Behavior: Behavior normal.           ECG 12 Lead    Date/Time: 6/18/2021 12:49 PM  Performed by: Riana Cordoba APRN  Authorized by: Riana Cordoba APRN   Comparison: compared with previous ECG from 5/13/2021  Rhythm: sinus rhythm  Rate: normal  ST Segments: ST segments normal  T Waves: T waves normal  QRS axis: normal    Clinical impression: normal ECG            Assessment/Plan   Diagnoses and all orders for this " visit:    Diagnoses and all orders for this visit:    1. Essential hypertension (Primary)  Comments:  Uncontrolled. Add amlodipine 5 mg daily to current medications. RTO in one month.   Orders:  -     amLODIPine (NORVASC) 5 MG tablet; Take 1 tablet by mouth Daily.  Dispense: 30 tablet; Refill: 1    2. Dyspnea on exertion  Comments:  EKG normal today--no changes since last done in May. CXR and CBC today. Given triad of dyspnea, neck pain and diaphoresis with activity as well as risk factors for cardiovascular disease needs Cardiolite stress test.    Orders:  -     Stress test with myocardial perfusion  -     CBC & Differential  -     XR Chest 2 View  -     ECG 12 Lead    3. Neck pain  Comments:  Given triad of dyspnea, neck pain and diaphoresis with activity as well as risk factors for cardiovascular disease needs Cardiolite stress test.   Orders:  -     Stress test with myocardial perfusion  -     ECG 12 Lead    4. Diaphoresis  Comments:  Given triad of dyspnea, neck pain and diaphoresis with activity as well as risk factors for cardiovascular disease needs Cardiolite stress test.   Orders:  -     Stress test with myocardial perfusion  -     ECG 12 Lead    5. Morbid obesity with BMI of 40.0-44.9, adult (CMS/[facility])  Comments:  Weight loss via dietary changes and 150 minutes aerobic activity advised.     Records reviewed include previous OV with myself as well as labs.     Return in about 1 month (around 7/18/2021) for Lab Today.

## 2021-06-18 ENCOUNTER — OFFICE VISIT (OUTPATIENT)
Dept: INTERNAL MEDICINE | Facility: CLINIC | Age: 61
End: 2021-06-18

## 2021-06-18 ENCOUNTER — HOSPITAL ENCOUNTER (OUTPATIENT)
Dept: GENERAL RADIOLOGY | Facility: HOSPITAL | Age: 61
Discharge: HOME | End: 2021-06-18
Admitting: NURSE PRACTITIONER

## 2021-06-18 VITALS
HEART RATE: 76 BPM | RESPIRATION RATE: 20 BRPM | SYSTOLIC BLOOD PRESSURE: 138 MMHG | WEIGHT: 279 LBS | HEIGHT: 67 IN | BODY MASS INDEX: 43.79 KG/M2 | DIASTOLIC BLOOD PRESSURE: 90 MMHG | TEMPERATURE: 97.2 F

## 2021-06-18 DIAGNOSIS — R61 DIAPHORESIS: ICD-10-CM

## 2021-06-18 DIAGNOSIS — M54.2 NECK PAIN: ICD-10-CM

## 2021-06-18 DIAGNOSIS — E66.01 MORBID OBESITY WITH BMI OF 40.0-44.9, ADULT (HCC): Chronic | ICD-10-CM

## 2021-06-18 DIAGNOSIS — R06.09 DYSPNEA ON EXERTION: ICD-10-CM

## 2021-06-18 DIAGNOSIS — I10 ESSENTIAL HYPERTENSION: Primary | Chronic | ICD-10-CM

## 2021-06-18 PROCEDURE — 71046 X-RAY EXAM CHEST 2 VIEWS: CPT

## 2021-06-18 PROCEDURE — 93000 ELECTROCARDIOGRAM COMPLETE: CPT | Performed by: NURSE PRACTITIONER

## 2021-06-18 PROCEDURE — 99214 OFFICE O/P EST MOD 30 MIN: CPT | Performed by: NURSE PRACTITIONER

## 2021-06-18 RX ORDER — AMLODIPINE BESYLATE 5 MG/1
5 TABLET ORAL DAILY
Qty: 30 TABLET | Refills: 1 | Status: SHIPPED | OUTPATIENT
Start: 2021-06-18

## 2021-06-19 LAB
BASOPHILS # BLD AUTO: 0.02 10*3/MM3 (ref 0–0.2)
BASOPHILS NFR BLD AUTO: 0.4 % (ref 0–1.5)
EOSINOPHIL # BLD AUTO: 0.1 10*3/MM3 (ref 0–0.4)
EOSINOPHIL NFR BLD AUTO: 2 % (ref 0.3–6.2)
ERYTHROCYTE [DISTWIDTH] IN BLOOD BY AUTOMATED COUNT: 12.5 % (ref 12.3–15.4)
HCT VFR BLD AUTO: 41.4 % (ref 34–46.6)
HGB BLD-MCNC: 13.8 G/DL (ref 12–15.9)
IMM GRANULOCYTES # BLD AUTO: 0.02 10*3/MM3 (ref 0–0.05)
IMM GRANULOCYTES NFR BLD AUTO: 0.4 % (ref 0–0.5)
LYMPHOCYTES # BLD AUTO: 2.38 10*3/MM3 (ref 0.7–3.1)
LYMPHOCYTES NFR BLD AUTO: 47.5 % (ref 19.6–45.3)
MCH RBC QN AUTO: 28.9 PG (ref 26.6–33)
MCHC RBC AUTO-ENTMCNC: 33.3 G/DL (ref 31.5–35.7)
MCV RBC AUTO: 86.6 FL (ref 79–97)
MONOCYTES # BLD AUTO: 0.45 10*3/MM3 (ref 0.1–0.9)
MONOCYTES NFR BLD AUTO: 9 % (ref 5–12)
NEUTROPHILS # BLD AUTO: 2.04 10*3/MM3 (ref 1.7–7)
NEUTROPHILS NFR BLD AUTO: 40.7 % (ref 42.7–76)
NRBC BLD AUTO-RTO: 0 /100 WBC (ref 0–0.2)
PLATELET # BLD AUTO: 268 10*3/MM3 (ref 140–450)
RBC # BLD AUTO: 4.78 10*6/MM3 (ref 3.77–5.28)
WBC # BLD AUTO: 5.01 10*3/MM3 (ref 3.4–10.8)

## 2021-08-10 ENCOUNTER — APPOINTMENT (OUTPATIENT)
Dept: NUCLEAR MEDICINE | Facility: HOSPITAL | Age: 61
End: 2021-08-10

## 2021-10-09 DIAGNOSIS — I10 ESSENTIAL HYPERTENSION: Chronic | ICD-10-CM

## 2021-10-09 DIAGNOSIS — E11.9 TYPE 2 DIABETES MELLITUS WITHOUT COMPLICATION, WITHOUT LONG-TERM CURRENT USE OF INSULIN (HCC): ICD-10-CM

## 2021-10-11 RX ORDER — ROSUVASTATIN CALCIUM 5 MG/1
TABLET, COATED ORAL
Qty: 90 TABLET | Refills: 3 | OUTPATIENT
Start: 2021-10-11

## 2021-10-11 RX ORDER — LOSARTAN POTASSIUM 100 MG/1
TABLET ORAL
Qty: 90 TABLET | Refills: 0 | OUTPATIENT
Start: 2021-10-11

## 2021-10-11 NOTE — TELEPHONE ENCOUNTER
Needs to schedule appt then ok to give her #30 NR of Losartan 100 mg and Crestor 5 mg. No additional refills after this till she is seen in the office.

## 2022-05-05 ENCOUNTER — TELEPHONE (OUTPATIENT)
Dept: INTERNAL MEDICINE | Facility: CLINIC | Age: 62
End: 2022-05-05

## 2022-05-05 LAB — HBA1C MFR BLD: 6.8 %
